# Patient Record
Sex: MALE | Race: WHITE
[De-identification: names, ages, dates, MRNs, and addresses within clinical notes are randomized per-mention and may not be internally consistent; named-entity substitution may affect disease eponyms.]

---

## 2018-09-26 ENCOUNTER — HOSPITAL ENCOUNTER (EMERGENCY)
Dept: HOSPITAL 80 - FED | Age: 33
Discharge: HOME | End: 2018-09-26
Payer: MEDICAID

## 2018-09-26 VITALS — SYSTOLIC BLOOD PRESSURE: 124 MMHG | DIASTOLIC BLOOD PRESSURE: 81 MMHG

## 2018-09-26 DIAGNOSIS — R45.851: Primary | ICD-10-CM

## 2018-09-26 DIAGNOSIS — Z86.59: ICD-10-CM

## 2018-09-26 DIAGNOSIS — F32.9: ICD-10-CM

## 2018-09-26 LAB — PLATELET # BLD: 344 10^3/UL (ref 150–400)

## 2018-09-26 PROCEDURE — G0480 DRUG TEST DEF 1-7 CLASSES: HCPCS
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
## 2018-09-26 NOTE — EDPHY
H & P


Stated Complaint: pt asking about assisted suicide


Time Seen by Provider: 09/26/18 09:44


HPI/ROS: 





CHIEF COMPLAINT:  M1





HISTORY OF PRESENT ILLNESS:  33-year-old male history of self-described chronic 

suicidal ideation since age 8, history of cutting behavior states that he went 

to [facility] this morning concerned about the state of affairs in 

the world, so afraid that the world is "going to implode" and inquired about 

physician assisted suicide.  He then left a period thereafter and police came 

to his house to place my on M1 hold for concerns of suicidal ideation.  Patient 

states that he is not actively endorsing suicidal ideation simply want away to  

"leave  this world."He denies recent cutting behavior.  Denies hallucination.  

Denies alcohol or drug use acutely.  Denies homicidal ideation





PRIMARY CARE PROVIDER:  





REVIEW OF SYSTEMS:


10 systems reviewed and negative with the exception of the elements mentioned 

in the history of present illness








PAST MEDICAL & SURGICAL  HISTORY:  Self-described chronic suicidal ideation and 

cutting behavior





SOCIAL HISTORY: Denies acute alcohol or drug use    














************


PHYSICAL EXAM





(Prior to examination, patient consented to physical exam, hands were washed 

and my usual and customary physical exam procedures followed)


1) GENERAL: Well-developed, well-nourished, alert and oriented.  Appears to be 

in no acute distress.  Comp cooperative


2) HEAD: Normocephalic, atraumatic


3) HEENT: Pupils equal, round, reactive to light bilaterally.  Sclera 

anicteric. 


4) NECK: Full range of motion, no meningeal signs.


5) LUNGS: Clear auscultation bilaterally, no wheezes, no rhonchi, no 

retractions.   


6) HEART: Regular rate and rhythm, no murmur, no heave, no gallop.


7) ABDOMEN: No guarding, no rebound, no focal tenderness, negative McBurney's, 

negative Reeves's, negative Rovsing's, negative peritoneal sign,


8) MUSCULOSKELETAL:  No signs of acute injury.  Moving all extremities, no 

focal areas of tenderness, no obvious trauma.  No peripheral edema or 

discoloration.


9) BACK: No CVA tenderness, no midline vertebral tenderness, no fluctuance, no 

step-off, no obvious trauma, no visual or palpable abnormality. 


10) SKIN: No rash, no petechiae. 


11) Psychiatric:  Patient is oriented X 3, there is no agitation.








***************





DIFFERENTIAL DIAGNOSIS:  In no particular order including but not limited to 

depression, suicidal ideation, homicidal ideation








- Personal History


Current Tetanus/Diphtheria Vaccine: Unsure


Current Tetanus Diphtheria and Acellular Pertussis (TDAP): Unsure





- Medical/Surgical History


Hx Asthma: No


Hx Chronic Respiratory Disease: No


Hx Diabetes: No


Hx Cardiac Disease: No


Hx Renal Disease: No


Hx Cirrhosis: No


Hx Alcoholism: No


Hx HIV/AIDS: No


Hx Splenectomy or Spleen Trauma: No


Other PMH: Appy as child.  "Depressed since I was 8"





- Social History


Smoking Status: Former smoker


Constitutional: 


 Initial Vital Signs











Temperature (C)  36.5 C   09/26/18 09:57


 


Heart Rate  83   09/26/18 09:57


 


Respiratory Rate  18   09/26/18 09:57


 


Blood Pressure  133/92 H  09/26/18 09:57


 


O2 Sat (%)  98   09/26/18 09:57








 











O2 Delivery Mode               Room Air














Allergies/Adverse Reactions: 


 





No Known Allergies Allergy (Unverified 09/26/18 10:07)


 








Home Medications: 














 Medication  Instructions  Recorded


 


NK [No Known Home Meds]  09/26/18














Medical Decision Making


ED Course/Re-evaluation: 





10:04 a.m.:  This patient is currently on a pre-hospital M1.  Diagnostic 

studies will be obtained and mental health evaluator will be consulted.  I saw 

this patient independently based on established practice protocols.  Care of 

patient under supervision of  secondary supervising physician Dr Fraga with 

whom I discussed case.  Patient is currently calm and cooperative





2:20 p.m.:  Mental health evaluator has evaluated the patient, consultation Dr. Elias Sheikh recommend vacated the M1 hold.





- Data Points


Laboratory Results: 


 Laboratory Results





 09/26/18 10:11 





 09/26/18 10:11 





 











  09/26/18 09/26/18 09/26/18





  10:11 10:11 10:11


 


WBC      9.07 10^3/uL 10^3/uL





     (3.80-9.50) 


 


RBC      4.95 10^6/uL 10^6/uL





     (4.40-6.38) 


 


Hgb      16.6 g/dL g/dL





     (13.7-17.5) 


 


Hct      45.3 % %





     (40.0-51.0) 


 


MCV      91.5 fL fL





     (81.5-99.8) 


 


MCH      33.5 pg pg





     (27.9-34.1) 


 


MCHC      36.6 g/dL g/dL





     (32.4-36.7) 


 


RDW      12.0 % %





     (11.5-15.2) 


 


Plt Count      344 10^3/uL 10^3/uL





     (150-400) 


 


MPV      9.4 fL fL





     (8.7-11.7) 


 


Neut % (Auto)      66.9 % %





     (39.3-74.2) 


 


Lymph % (Auto)      25.5 % %





     (15.0-45.0) 


 


Mono % (Auto)      5.0 % %





     (4.5-13.0) 


 


Eos % (Auto)      1.2 % %





     (0.6-7.6) 


 


Baso % (Auto)      0.7 % %





     (0.3-1.7) 


 


Nucleat RBC Rel Count      0.0 % %





     (0.0-0.2) 


 


Absolute Neuts (auto)      6.08 10^3/uL 10^3/uL





     (1.70-6.50) 


 


Absolute Lymphs (auto)      2.31 10^3/uL 10^3/uL





     (1.00-3.00) 


 


Absolute Monos (auto)      0.45 10^3/uL 10^3/uL





     (0.30-0.80) 


 


Absolute Eos (auto)      0.11 10^3/uL 10^3/uL





     (0.03-0.40) 


 


Absolute Basos (auto)      0.06 10^3/uL 10^3/uL





     (0.02-0.10) 


 


Absolute Nucleated RBC      0.00 10^3/uL 10^3/uL





     (0-0.01) 


 


Immature Gran %      0.7 % %





     (0.0-1.1) 


 


Immature Gran #      0.06 10^3/uL 10^3/uL





     (0.00-0.10) 


 


Sodium    141 mEq/L mEq/L  





    (135-145)  


 


Potassium    3.8 mEq/L mEq/L  





    (3.3-5.0)  


 


Chloride    100 mEq/L mEq/L  





    ()  


 


Carbon Dioxide    29 mEq/l mEq/l  





    (22-31)  


 


Anion Gap    12 mEq/L mEq/L  





    (8-16)  


 


BUN    11 mg/dL mg/dL  





    (7-23)  


 


Creatinine    1.0 mg/dL mg/dL  





    (0.7-1.3)  


 


Estimated GFR    > 60   





    


 


Glucose    109 mg/dL H mg/dL  





    ()  


 


Calcium    9.8 mg/dL mg/dL  





    (8.5-10.4)  


 


Salicylates    < 1.0 mg/dL L mg/dL  





    (2.0-20.0)  


 


Urine Opiates Screen  NEGATIVE     





   (NEGATIVE)   


 


Acetaminophen    < 10 mcg/mL L mcg/mL  





    (10-30)  


 


Urine Barbiturates  NEGATIVE     





   (NEGATIVE)   


 


Ur Phencyclidine Scrn  NEGATIVE     





   (NEGATIVE)   


 


Ur Amphetamine Screen  NEGATIVE     





   (NEGATIVE)   


 


U Benzodiazepines Scrn  NEGATIVE     





   (NEGATIVE)   


 


Urine Cocaine Screen  NEGATIVE     





   (NEGATIVE)   


 


U Marijuana (THC) Screen  NON-NEGATIVE  H     





   (NEGATIVE)   


 


Ethyl Alcohol    < 10 mg/dL mg/dL  





    (0-10)  














Departure





- Departure


Disposition: Home, Routine, Self-Care


Clinical Impression: 


Depression


Qualifiers:


 Depression Type: unspecified Qualified Code(s): F32.9 - Major depressive 

disorder, single episode, unspecified





Condition: Good


Instructions:  Depression (ED)


Additional Instructions: 


Return to the ER immediately if you experience thoughts of hurting yourself, 

thoughts of hurting other people, thoughts of killing other people or killing 

yourself.


Referrals: 


[facility] [Clinic] - 2-3 days, call for appt.
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
